# Patient Record
Sex: FEMALE | Race: WHITE | NOT HISPANIC OR LATINO | URBAN - METROPOLITAN AREA
[De-identification: names, ages, dates, MRNs, and addresses within clinical notes are randomized per-mention and may not be internally consistent; named-entity substitution may affect disease eponyms.]

---

## 2018-06-14 NOTE — PATIENT DISCUSSION
Greater than 50% of exam spent in dialogue with patient. I have recommended observation and have reassured her that there are no retinal tears or detachment seen in either eye. I will see her on an as needed basis. She will follow up with Dr. Juan Manuel Cobb for general ophthalmic care.

## 2021-10-28 ENCOUNTER — NEW PATIENT ROUTINE/VISION (OUTPATIENT)
Dept: URBAN - METROPOLITAN AREA CLINIC 52 | Facility: CLINIC | Age: 71
End: 2021-10-28

## 2021-10-28 DIAGNOSIS — H25.813: ICD-10-CM

## 2021-10-28 DIAGNOSIS — Z01.01: ICD-10-CM

## 2021-10-28 DIAGNOSIS — H43.813: ICD-10-CM

## 2021-10-28 PROCEDURE — 92015 DETERMINE REFRACTIVE STATE: CPT

## 2021-10-28 PROCEDURE — 92004 COMPRE OPH EXAM NEW PT 1/>: CPT

## 2021-10-28 ASSESSMENT — VISUAL ACUITY
OU_CC: 20/25
OS_GLARE: 20/30
OS_GLARE: 20/50
OS_CC: 20/20
OD_CC: 20/30
OU_CC: J1+
OD_GLARE: 20/30
OD_GLARE: 20/50

## 2021-10-28 ASSESSMENT — TONOMETRY
OD_IOP_MMHG: 16
OS_IOP_MMHG: 16

## 2023-01-05 ENCOUNTER — COMPREHENSIVE EXAM (OUTPATIENT)
Dept: URBAN - METROPOLITAN AREA CLINIC 52 | Facility: CLINIC | Age: 73
End: 2023-01-05

## 2023-01-05 DIAGNOSIS — H25.813: ICD-10-CM

## 2023-01-05 DIAGNOSIS — H52.4: ICD-10-CM

## 2023-01-05 DIAGNOSIS — H43.813: ICD-10-CM

## 2023-01-05 PROCEDURE — 92015 DETERMINE REFRACTIVE STATE: CPT

## 2023-01-05 PROCEDURE — 92014 COMPRE OPH EXAM EST PT 1/>: CPT

## 2023-01-05 ASSESSMENT — VISUAL ACUITY
OU_CC: J1@14IN
OS_CC: 20/30-1
OS_GLARE: 20/30
OU_CC: 20/25
OS_GLARE: 20/25
OD_CC: 20/25-2
OD_GLARE: 20/30
OD_GLARE: 20/25

## 2023-01-05 ASSESSMENT — TONOMETRY
OS_IOP_MMHG: 16
OD_IOP_MMHG: 16

## 2024-01-25 ENCOUNTER — COMPREHENSIVE EXAM (OUTPATIENT)
Dept: URBAN - METROPOLITAN AREA CLINIC 52 | Facility: CLINIC | Age: 74
End: 2024-01-25

## 2024-01-25 DIAGNOSIS — H52.203: ICD-10-CM

## 2024-01-25 DIAGNOSIS — Z01.01: ICD-10-CM

## 2024-01-25 PROCEDURE — 92014 COMPRE OPH EXAM EST PT 1/>: CPT

## 2024-01-25 PROCEDURE — 92015 DETERMINE REFRACTIVE STATE: CPT

## 2024-01-25 ASSESSMENT — VISUAL ACUITY
OD_GLARE: 20/30
OD_GLARE: 20/40
OS_CC: 20/20-1
OD_CC: 20/20-1
OS_GLARE: 20/30
OS_GLARE: 20/25
OU_CC: J1+

## 2024-01-25 ASSESSMENT — TONOMETRY
OS_IOP_MMHG: 16
OD_IOP_MMHG: 16

## 2025-01-30 ENCOUNTER — COMPREHENSIVE EXAM (OUTPATIENT)
Age: 75
End: 2025-01-30

## 2025-01-30 DIAGNOSIS — H25.813: ICD-10-CM

## 2025-01-30 DIAGNOSIS — H04.123: ICD-10-CM

## 2025-01-30 DIAGNOSIS — H52.02: ICD-10-CM

## 2025-01-30 DIAGNOSIS — H52.203: ICD-10-CM

## 2025-01-30 DIAGNOSIS — H43.813: ICD-10-CM

## 2025-01-30 DIAGNOSIS — H52.4: ICD-10-CM

## 2025-01-30 PROCEDURE — 99214 OFFICE O/P EST MOD 30 MIN: CPT

## 2025-01-30 PROCEDURE — 92015 DETERMINE REFRACTIVE STATE: CPT
